# Patient Record
Sex: FEMALE | Race: WHITE
[De-identification: names, ages, dates, MRNs, and addresses within clinical notes are randomized per-mention and may not be internally consistent; named-entity substitution may affect disease eponyms.]

---

## 2018-02-10 ENCOUNTER — HOSPITAL ENCOUNTER (EMERGENCY)
Dept: HOSPITAL 74 - FER | Age: 38
Discharge: HOME | End: 2018-02-10
Payer: COMMERCIAL

## 2018-02-10 VITALS — DIASTOLIC BLOOD PRESSURE: 77 MMHG | SYSTOLIC BLOOD PRESSURE: 120 MMHG | HEART RATE: 74 BPM | TEMPERATURE: 98.6 F

## 2018-02-10 VITALS — BODY MASS INDEX: 30.3 KG/M2

## 2018-02-10 DIAGNOSIS — B97.89: ICD-10-CM

## 2018-02-10 DIAGNOSIS — J06.9: Primary | ICD-10-CM

## 2018-02-10 LAB
COLOR UR: (no result)
EPITH CASTS URNS QL MICRO: (no result) /HPF
LEUKOCYTE ESTERASE UR QL STRIP.AUTO: (no result)
PH UR: 7.5 [PH] (ref 4.5–8)
RBC # BLD AUTO: (no result) /HPF (ref 0–3)
SP GR UR: 1.01 (ref 1–1.02)
UROBILINOGEN UR STRIP-MCNC: 0.2 MG/DL (ref 0.2–1)

## 2018-02-10 NOTE — PDOC
History of Present Illness





- General


Chief Complaint: Cold Symptoms


Stated Complaint: flu,urinary symptoms


Time Seen by Provider: 02/10/18 10:52





- History of Present Illness


Initial Comments: 





02/10/18 11:48


Chief complaint: Flu symptoms including nonproductive cough, body aches. Lives 

in household with cases of documented influenza





History of present illness: Symptoms as above for 2 days.





Review of systems: Denies fever, nausea, vomiting, abdominal pain, chest pain, 

shortness of breath





Past medical history: Healthy, no current medical or surgical problems





Social history: To members of family have documented influenza. No tobacco 

alcohol or nonprescription drugs





Family history as above otherwise negative





Physical exam: Alert and oriented well-developed well-nourished no acute 

distress cooperative


Afebrile, vital signs normal


HEENT clear


Neck supple without bruit mass or nodes


Chest clear


CV regular without murmur or gallop


Abdomen benign


Skin clear, no rash, adequate turgor and wet mucous membranes





Impression: Viral URI, rule out strep, probable influenza





Plan: Symptomatic treatment and further medical management depending on results.





Past History





- Past Medical History


Allergies/Adverse Reactions: 


 Allergies











Allergy/AdvReac Type Severity Reaction Status Date / Time


 


amoxicillin Allergy  Severe Unverified 03/09/17 13:19





   rash in  





   folds  


 


poison ivy extract Allergy   Unverified 05/23/14 12:04





[Poison Ivy Extract]     


 


bee stings Allergy Severe Throat Uncoded 05/23/14 12:04





   swelling,  





   hives  


 


trees Allergy   Uncoded 03/09/17 13:19











Home Medications: 


Ambulatory Orders





Oseltamivir Phosphate [Tamiflu] 75 mg PO BID #10 capsule 02/10/18 











Medical Decision Making





- Medical Decision Making





02/10/18 11:50


Strep screen is negative





Rest fluids Tylenol and Tamiflu as directed. Recheck immediately if symptoms 

worsen.





*DC/Admit/Observation/Transfer


Diagnosis at time of Disposition: 


 Viral upper respiratory infection








- Discharge Dispostion


Disposition: HOME


Condition at time of disposition: Stable


Admit: No





- Prescriptions


Prescriptions: 


Oseltamivir Phosphate [Tamiflu] 75 mg PO BID #10 capsule





- Referrals





- Patient Instructions


Printed Discharge Instructions:  DI for Viral Upper Respiratory Infection -- 

Adult





- Post Discharge Activity


Forms/Work/School Notes:  Back to Work